# Patient Record
Sex: FEMALE | Race: BLACK OR AFRICAN AMERICAN | Employment: UNEMPLOYED | URBAN - METROPOLITAN AREA
[De-identification: names, ages, dates, MRNs, and addresses within clinical notes are randomized per-mention and may not be internally consistent; named-entity substitution may affect disease eponyms.]

---

## 2024-04-23 ENCOUNTER — HOSPITAL ENCOUNTER (EMERGENCY)
Facility: CLINIC | Age: 59
Discharge: HOME | End: 2024-04-23
Attending: PHYSICIAN ASSISTANT | Admitting: PHYSICIAN ASSISTANT

## 2024-04-23 VITALS
TEMPERATURE: 98.1 F | BODY MASS INDEX: 35.78 KG/M2 | HEIGHT: 65 IN | OXYGEN SATURATION: 97 % | WEIGHT: 214.73 LBS | RESPIRATION RATE: 18 BRPM | HEART RATE: 95 BPM | SYSTOLIC BLOOD PRESSURE: 175 MMHG | DIASTOLIC BLOOD PRESSURE: 114 MMHG

## 2024-04-23 DIAGNOSIS — K08.89 PAIN, DENTAL: ICD-10-CM

## 2024-04-23 PROCEDURE — 99283 EMERGENCY DEPT VISIT LOW MDM: CPT

## 2024-04-23 ASSESSMENT — ACTIVITIES OF DAILY LIVING (ADL)
ADLS_ACUITY_SCORE: 35
ADLS_ACUITY_SCORE: 35
ADLS_ACUITY_SCORE: 33

## 2024-04-23 ASSESSMENT — COLUMBIA-SUICIDE SEVERITY RATING SCALE - C-SSRS
1. IN THE PAST MONTH, HAVE YOU WISHED YOU WERE DEAD OR WISHED YOU COULD GO TO SLEEP AND NOT WAKE UP?: NO
2. HAVE YOU ACTUALLY HAD ANY THOUGHTS OF KILLING YOURSELF IN THE PAST MONTH?: NO
6. HAVE YOU EVER DONE ANYTHING, STARTED TO DO ANYTHING, OR PREPARED TO DO ANYTHING TO END YOUR LIFE?: NO

## 2024-04-23 NOTE — DISCHARGE INSTRUCTIONS
Discharge Instructions  Dental Pain    You have been seen today for a toothache. Your pain may be caused by an exposed nerve, an infection (pulpitis), a root abscess (pocket of pus), or other problems. You will need to see a dentist for a solution to your tooth problem. Emergency Department care is only to help control your problem until you can see a dentist; we cannot provide complete dental care.  Today, we did not find any sign that your toothache was caused by any dangerous or life-threatening condition, but sometimes symptoms develop over time and cannot be found during an emergency visit, so it is very important that you follow up with your dentist.      Generally, every Emergency Department visit should have a follow-up clinic visit with either a primary or a specialty clinic/provider. Please follow-up as instructed by your emergency provider today.    Return to the Emergency Department if:  You develop a new fever over 100.4 F.  You cannot open your mouth normally, cannot move your tongue well, or cannot swallow.  You have new or increased swelling of your face or neck.  You develop drainage of pus or foul smelling material from around your tooth.  What can I do to help myself?  Take any antibiotic the provider may have prescribed for you today.  Avoid very hot or very cold foods as both can cause pain.  Make an appointment to see a dentist as soon as possible. Dentists are generally not  on-staff  at hospitals so we cannot  refer  to you to dentist but we may be able to provide a list of dental clinics to help you.  If you were given a prescription for medicine here today, be sure to read all of the information (including the package insert) that comes with your prescription.  This will include important information about the medicine, its side effects, and any warnings that you need to know about.  The pharmacist who fills the prescription can provide more information and answer questions you may have  about the medicine.  If you have questions or concerns that the pharmacist cannot address, please call or return to the Emergency Department.   Remember that you can always come back to the Emergency Department if you are not able to see your regular provider in the amount of time listed above, if you get any new symptoms, or if there is anything that worries you.

## 2024-04-23 NOTE — ED TRIAGE NOTES
Patient reports a broken tooth on her right lower mouth. Patient states the tooth broke yesterday.      Triage Assessment (Adult)       Row Name 04/23/24 9169          Triage Assessment    Airway WDL WDL        Respiratory WDL    Respiratory WDL WDL        Skin Circulation/Temperature WDL    Skin Circulation/Temperature WDL WDL        Cardiac WDL    Cardiac WDL WDL        Peripheral/Neurovascular WDL    Peripheral Neurovascular WDL WDL        Cognitive/Neuro/Behavioral WDL    Cognitive/Neuro/Behavioral WDL WDL

## 2024-04-23 NOTE — ED PROVIDER NOTES
"  History     Chief Complaint:  Dental Pain       The history is provided by the patient.      Linda Boston is a 58 year old female for concerns of tooth pain. A few days ago, the patient broke a tooth. Patient endorses pain, fever, and diaphoresis. Patient has been using BC powder that she pours onto the affected tooth, along with Tylenol, for pain. Denies allergies to medications.    Independent Historian:   None - Patient Only    Review of External Notes:        Medications:    Amoxicillin    Past Medical History:    No other significant past medical history or family history.    Physical Exam   Patient Vitals for the past 24 hrs:   BP Temp Pulse Resp SpO2 Height Weight   04/23/24 1801 (!) 175/114 98.1  F (36.7  C) 95 18 97 % 1.651 m (5' 5\") 97.4 kg (214 lb 11.7 oz)        Physical Exam  Vitals and nursing note reviewed.   HENT:      Head:      Jaw: No trismus.      Mouth/Throat:      Mouth: Mucous membranes are moist.      Dentition: Abnormal dentition. Dental tenderness present. No gingival swelling.      Pharynx: Oropharynx is clear. No pharyngeal swelling, oropharyngeal exudate or posterior oropharyngeal erythema.     Eyes:      General: No scleral icterus.     Conjunctiva/sclera: Conjunctivae normal.   Pulmonary:      Effort: Pulmonary effort is normal.   Skin:     Findings: No abscess or erythema.   Neurological:      Mental Status: She is alert. Mental status is at baseline.   Psychiatric:         Mood and Affect: Mood normal.         Behavior: Behavior normal.         Thought Content: Thought content normal.           Emergency Department Course   Emergency Department Course & Assessments:    Interventions:  Medications - No data to display     Assessments/Consultations/Discussion of Management or Tests:   ED Course as of 04/24/24 0016   Tue Apr 23, 2024   184 I obtained history and examined the patient as noted above.       Social Determinants of Health affecting care:   None    Disposition:  The " patient was discharged.     Impression & Plan    CMS Diagnoses: None    Medical Decision Making:  This is a very pleasant 58 year old patient who presented with chipped tooth and dental pain. There is no clinical evidence of  peritonsillar abscess, retropharyngeal abscess, Lemierre's Syndrome, epiglottis, or Ish's angina. The patient's symptoms are consistent with viral pharyngitis.  I have recommended treatment with antibiotics to cover for infection and analgesics. I offered Toradol and dental block, but the patient declined any injections today.  Return if increasing pain, change in voice, neck pain, vomiting, fever, or shortness of breath. Follow-up with dentist this week.    Diagnosis:    ICD-10-CM    1. Pain, dental  K08.89          Discharge Medications:  Discharge Medication List as of 4/23/2024  6:48 PM        START taking these medications    Details   amoxicillin-clavulanate (AUGMENTIN) 875-125 MG tablet Take 1 tablet by mouth 2 times daily, Disp-20 tablet, R-0, Local Print            Scribe Disclosure:  I, Rosalba Jolly, am serving as a scribe  for Ranjan Barrera at 6:53 PM on 4/23/2024 to document services personally performed by Bayron Ness PA-C based on my observations and the provider's statements to me.    Scribe Disclosure:  I, Ranjan Barrera, am serving as a scribe at 6:44 PM on 4/23/2024 to document services personally performed by Bayron Ness PA-C based on my observations and the provider's statements to me.     4/23/2024   Bayron Ness PA-C Kruger, Jacob C, PA-C  04/24/24 0016